# Patient Record
Sex: FEMALE | Race: BLACK OR AFRICAN AMERICAN | NOT HISPANIC OR LATINO | URBAN - NONMETROPOLITAN AREA
[De-identification: names, ages, dates, MRNs, and addresses within clinical notes are randomized per-mention and may not be internally consistent; named-entity substitution may affect disease eponyms.]

---
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
## 2024-06-14 ENCOUNTER — NEW PATIENT (OUTPATIENT)
Dept: URBAN - NONMETROPOLITAN AREA CLINIC 1 | Facility: CLINIC | Age: 19
End: 2024-06-14

## 2024-06-14 DIAGNOSIS — H52.13: ICD-10-CM

## 2024-06-14 DIAGNOSIS — H52.223: ICD-10-CM

## 2024-06-14 PROCEDURE — S0620AEC ROUTINE OPH EXAM INCLUDES REF/ NEW PATIENT

## 2024-06-14 ASSESSMENT — VISUAL ACUITY
OD_CC: 20/40
OD_PH: 20/30
OS_CC: 20/50
OS_PH: 20/30

## 2024-06-14 ASSESSMENT — TONOMETRY
OD_IOP_MMHG: 18
OS_IOP_MMHG: 19

## 2024-06-21 ENCOUNTER — ESTABLISHED PATIENT (OUTPATIENT)
Dept: URBAN - NONMETROPOLITAN AREA CLINIC 1 | Facility: CLINIC | Age: 19
End: 2024-06-21

## 2024-06-21 DIAGNOSIS — H52.13: ICD-10-CM

## 2024-06-21 DIAGNOSIS — H52.223: ICD-10-CM

## 2024-06-21 PROCEDURE — 92310-14 CONTACT LENS FITTING - 150
